# Patient Record
Sex: MALE
[De-identification: names, ages, dates, MRNs, and addresses within clinical notes are randomized per-mention and may not be internally consistent; named-entity substitution may affect disease eponyms.]

---

## 2020-01-27 ENCOUNTER — HOSPITAL ENCOUNTER (EMERGENCY)
Dept: HOSPITAL 92 - ERS | Age: 35
Discharge: HOME | End: 2020-01-27
Payer: SELF-PAY

## 2020-01-27 DIAGNOSIS — E78.5: ICD-10-CM

## 2020-01-27 DIAGNOSIS — J11.1: ICD-10-CM

## 2020-01-27 DIAGNOSIS — E78.00: ICD-10-CM

## 2020-01-27 DIAGNOSIS — J06.9: Primary | ICD-10-CM

## 2020-01-27 PROCEDURE — 87081 CULTURE SCREEN ONLY: CPT

## 2020-01-27 PROCEDURE — 87430 STREP A AG IA: CPT

## 2020-01-27 PROCEDURE — 71046 X-RAY EXAM CHEST 2 VIEWS: CPT

## 2020-01-27 NOTE — RAD
Chest 2 views



HISTORY: Cough.



FINDINGS: Cardiac silhouette and pulmonary vasculature are unremarkable. Mediastinum is midline. No c
onfluent airspace consolidation, pneumothorax, or pleural fluid.











IMPRESSION: No active cardiopulmonary abnormalities are demonstrated.



Reported By: ANUSHA Sanchez 

Electronically Signed:  1/27/2020 2:18 PM